# Patient Record
Sex: FEMALE | Race: OTHER | HISPANIC OR LATINO | URBAN - METROPOLITAN AREA
[De-identification: names, ages, dates, MRNs, and addresses within clinical notes are randomized per-mention and may not be internally consistent; named-entity substitution may affect disease eponyms.]

---

## 2023-01-21 ENCOUNTER — EMERGENCY (EMERGENCY)
Facility: HOSPITAL | Age: 48
LOS: 1 days | Discharge: HOME | End: 2023-01-21
Attending: STUDENT IN AN ORGANIZED HEALTH CARE EDUCATION/TRAINING PROGRAM
Payer: COMMERCIAL

## 2023-01-21 VITALS
RESPIRATION RATE: 16 BRPM | HEART RATE: 80 BPM | OXYGEN SATURATION: 99 % | SYSTOLIC BLOOD PRESSURE: 149 MMHG | TEMPERATURE: 98 F | WEIGHT: 164.91 LBS | DIASTOLIC BLOOD PRESSURE: 91 MMHG | HEIGHT: 65 IN

## 2023-01-21 PROCEDURE — 12001 RPR S/N/AX/GEN/TRNK 2.5CM/<: CPT

## 2023-01-21 PROCEDURE — 99284 EMERGENCY DEPT VISIT MOD MDM: CPT | Mod: 25

## 2023-01-21 RX ORDER — LIDOCAINE HCL 20 MG/ML
10 VIAL (ML) INJECTION ONCE
Refills: 0 | Status: COMPLETED | OUTPATIENT
Start: 2023-01-21 | End: 2023-01-21

## 2023-01-21 RX ORDER — ACETAMINOPHEN 500 MG
975 TABLET ORAL ONCE
Refills: 0 | Status: COMPLETED | OUTPATIENT
Start: 2023-01-21 | End: 2023-01-21

## 2023-01-21 RX ORDER — TETANUS TOXOID, REDUCED DIPHTHERIA TOXOID AND ACELLULAR PERTUSSIS VACCINE, ADSORBED 5; 2.5; 8; 8; 2.5 [IU]/.5ML; [IU]/.5ML; UG/.5ML; UG/.5ML; UG/.5ML
0.5 SUSPENSION INTRAMUSCULAR ONCE
Refills: 0 | Status: COMPLETED | OUTPATIENT
Start: 2023-01-21 | End: 2023-01-21

## 2023-01-21 RX ADMIN — TETANUS TOXOID, REDUCED DIPHTHERIA TOXOID AND ACELLULAR PERTUSSIS VACCINE, ADSORBED 0.5 MILLILITER(S): 5; 2.5; 8; 8; 2.5 SUSPENSION INTRAMUSCULAR at 23:39

## 2023-01-21 RX ADMIN — Medication 975 MILLIGRAM(S): at 23:40

## 2023-01-21 NOTE — ED PROVIDER NOTE - PHYSICAL EXAMINATION
CONSTITUTIONAL: non-toxic, well appearing  SKIN: no rash, no petechiae.  EYES: pink conjunctiva, anicteric  NECK: Supple; FROM  CARD: extremities warm, dry, well perfused  RESP: no respiratory distress  ABD: non-distended  EXT: No edema. Left hand with 2 cm linear laceration to palmar 1st web space, FROM of fingers, strength and sensation intact, extremity NVI. No active bleeding.   NEURO: Alert, oriented.  PSYCH: Cooperative, appropriate.

## 2023-01-21 NOTE — ED PROVIDER NOTE - PATIENT PORTAL LINK FT
You can access the FollowMyHealth Patient Portal offered by [facility] by registering at the following website: [email or web address]. By joining [facility]’s FollowMyHealth portal, you will also be able to view your health information using other applications (apps) compatible with our system.

## 2023-01-21 NOTE — ED PROVIDER NOTE - CLINICAL SUMMARY MEDICAL DECISION MAKING FREE TEXT BOX
Danna: 47-year-old female with no prior past medical history who presents with left hand laceration x1 hour.  Patient states she accidentally broke a ceramic plate, and cut her hand on the plate.  Patient denies any possibility of foreign body.  Patient right-hand-dominant.  Denies any fevers, numbness, weakness, chest pain, shortness of breath.  Patient unsure of last tetanus vaccine.  Left hand with 2 cm linear laceration to palmar 1st web space, FROM of fingers, strength and sensation intact, extremity NVI. No active bleeding. Will provide supportive treatment, update TDAP, lac repair, likely DC with return for suture removal/return precautions.

## 2023-01-21 NOTE — ED PROVIDER NOTE - OBJECTIVE STATEMENT
47-year-old female with no prior past medical history who presents with left hand laceration x1 hour.  Patient states she accidentally broke a ceramic plate, and cut her hand on the plate.  Patient denies any possibility of foreign body.  Patient right-hand-dominant.  Denies any fevers, numbness, weakness, chest pain, shortness of breath.  Patient unsure of last tetanus vaccine.  Denies any additional complaints.

## 2023-01-21 NOTE — ED PROVIDER NOTE - NSFOLLOWUPINSTRUCTIONS_ED_ALL_ED_FT
Laceration    A laceration is a cut that goes through all of the layers of the skin and into the tissue that is right under the skin. Some lacerations heal on their own. Others need to be closed with skin adhesive strips, skin glue, stitches (sutures), or staples. Proper laceration care minimizes the risk of infection and helps the laceration to heal better.  If non-absorbable stitches or staples have been placed, they must be taken out within the time frame instructed by your healthcare provider.    Take over the counter acetaminophen (Tylenol) 650-1000 mg every 4-6 hours as needed for pain. Do not take more than 3000 mg in a 24 hour period. Be aware many over the counter and prescription medications also contain acetaminophen (Tylenol).     Take over the counter ibuprofen 400-600 mg every 6 hours with food as needed for pain.  Do not take these medications if you do not have pain or if you have any history of bleeding disorder or, kidney disease. Do not use ibuprofen if you are on blood thinners (anti-coagulation).    Return in 10-14 days for suture removal (2 stitches placed).     SEEK IMMEDIATE MEDICAL CARE IF YOU HAVE ANY OF THE FOLLOWING SYMPTOMS: swelling around the wound, worsening pain, drainage from the wound, red streaking going away from your wound, inability to move finger or toe near the laceration, or discoloration of skin near the laceration.

## 2023-01-22 PROCEDURE — 99283 EMERGENCY DEPT VISIT LOW MDM: CPT | Mod: 25

## 2023-01-22 PROCEDURE — 90715 TDAP VACCINE 7 YRS/> IM: CPT

## 2023-01-22 PROCEDURE — 90471 IMMUNIZATION ADMIN: CPT

## 2023-01-22 PROCEDURE — 12001 RPR S/N/AX/GEN/TRNK 2.5CM/<: CPT

## 2023-01-22 RX ADMIN — Medication 10 MILLILITER(S): at 01:11

## 2024-02-22 NOTE — ED ADULT NURSE NOTE - CCCP TRG CHIEF CMPLNT
Please send this report to patient:   Fibroscan Procedure   Name: Adan Dorsey Date of Procedure : 2024  :: Elaine Juarez MD Diagnosis: NAFLD  Probe: XL  Fibroscan readin.0 KPa  Fibrosis: F0 = no fibrosis    CAP readin dB/m  Steatosis: :S2 = >34%, but <67% (approx 40%) of the liver has fat.     Recommend: -  Low Carbohydrate Diet: Limit intake of listed foods (below) to half of current intake.   Foods to limit are: Rice, potatoes, corn, corn starch- and flour-containing food products (e.g., breads, pastas, cookies, cakes, etc.), sweets and fruits. Avoid alcohol.   Proteins can be substituted in place of carbs.  Generally fish is good as a source of fish oil.   - Regular exercise - Avoid alcohol - Repeat Fibroscan in 6 months.    Elaine Juarez MD Hepatology
c/o laceration in her left hand from a broken plate/lacerations